# Patient Record
(demographics unavailable — no encounter records)

---

## 2025-06-30 NOTE — HISTORY OF PRESENT ILLNESS
[Patient] : patient [FreeTextEntry2] : patient is 18 weeks pregnant and wanted to discuss multiple concerns regarding the baby , immunisations , infections , feeding etc time spent was 45 minutes